# Patient Record
Sex: FEMALE | Race: AMERICAN INDIAN OR ALASKA NATIVE
[De-identification: names, ages, dates, MRNs, and addresses within clinical notes are randomized per-mention and may not be internally consistent; named-entity substitution may affect disease eponyms.]

---

## 2018-07-23 ENCOUNTER — HOSPITAL ENCOUNTER (EMERGENCY)
Dept: HOSPITAL 43 - DL.ED | Age: 58
Discharge: HOME | End: 2018-07-23
Payer: MEDICAID

## 2018-07-23 VITALS — DIASTOLIC BLOOD PRESSURE: 92 MMHG | SYSTOLIC BLOOD PRESSURE: 137 MMHG

## 2018-07-23 DIAGNOSIS — I10: ICD-10-CM

## 2018-07-23 DIAGNOSIS — Z79.899: ICD-10-CM

## 2018-07-23 DIAGNOSIS — S22.42XA: Primary | ICD-10-CM
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
## 2018-07-23 DIAGNOSIS — Z88.8: ICD-10-CM

## 2018-07-23 NOTE — CR
Clinical history: 58-year-old female complaining of left rib pain (fall).

 

Interpretation: Apparent nondisplaced fractures (age uncertain) anterolateral left sixth and anterior
 left seventh ribs with some underlying patchy atelectasis (postinflammatory fibrosis?), Left lower l
obe.

 

Osteopenia and mild scoliosis with subtle compression T7 vertebral body (associated disc degeneration
 T7-8).

 

Normal cardiac silhouette and pulmonary vascularity without alveolar edema or dependent effusion.

 

Note: This patient had a dense pneumonic like consolidation in the left lower lobe 30 October 2009 i.
e. some of what I am seeing could represent postinflammatory scarring left lung base.

 

No lung mass or focal lobar pneumonia. No pneumothorax.

 

CONCLUSION: Subtle irregularities anterior left sixth and seventh ribs (see above) that could represe
nt occult nondisplaced fractures. Underlying left lower lobe atelectasis or fibrosis. No pneumothorax
.

## 2018-07-23 NOTE — EDM.PDOC
ED HPI GENERAL MEDICAL PROBLEM





- General


Stated Complaint: [id number] CHEST PAIN


Time Seen by Provider: 07/23/18 13:06


Source of Information: Reports: Patient


History Limitations: Reports: No Limitations





- History of Present Illness


INITIAL COMMENTS - FREE TEXT/NARRATIVE: 





Patient comes emergency department today with complaints of an injury to her 

left chest. The timing of the injury is uncertain if she changes her story 

between nurse myself and the CNA. She tells me that 2 days ago she fell and 

injured her ribs. She tells the nursing staff that she fell 2 weeks ago. She 

does have a history of RA and walks with a walker and lost her balance and fell 

landing on her left ribs. She did not hit her head neck or back she denies any 

neck or back pain. She denies any loss of consciousness. She has been taking 

Tylenol for the pain. Has not been doing much for the pain. She has not applied 

any ice. She has had no shortness of breath fever cough or congestion. No 

abdominal pain nausea or vomiting. No black or tarry stools.


  ** Left Flank


Pain Score (Numeric/FACES): 8





- Related Data


 Allergies











Allergy/AdvReac Type Severity Reaction Status Date / Time


 


No Known Allergies Allergy   Verified 09/04/16 09:59











Home Meds: 


 Home Meds





Multivitamin [Multi Vitamin Daily] 1 each PO DAILY 12/17/13 [History]


prednisoLONE [Prednisolone] 10 mg PO DAILY 12/17/13 [History]


Bismuth Subsalicylate [Pepto Bismol] 30 ml PO ASDIRECTED PRN 12/04/14 [History]











Past Medical History


HEENT History: Reports: Impaired Vision


Cardiovascular History: Reports: Hypertension


Respiratory History: Reports: None


Gastrointestinal History: Reports: None


Genitourinary History: Reports: None


OB/GYN History: Reports: None


Musculoskeletal History: Reports: RA


Neurological History: Reports: None


Psychiatric History: Reports: None


Endocrine/Metabolic History: Reports: None


Hematologic History: Reports: None


Immunologic History: Reports: None


Oncologic (Cancer) History: Reports: None


Dermatologic History: Reports: None





Social & Family History





- Family History


Family Medical History: Noncontributory





- Living Situation & Occupation


Living situation: Reports: with Family





ED ROS GENERAL





- Review of Systems


Review Of Systems: ROS reveals no pertinent complaints other than HPI.





ED EXAM, GENERAL





- Physical Exam


Exam: See Below


Exam Limited By: No Limitations


General Appearance: Alert


Eye Exam: Bilateral Eye: Normal Inspection


Ears: Normal External Exam


Nose: Normal Inspection


Throat/Mouth: Normal Inspection


Head: Atraumatic, Normocephalic


Neck: Normal Inspection, Supple


Respiratory/Chest: No Respiratory Distress, No Accessory Muscle Use, Decreased 

Breath Sounds (In the left lower base.), Crackles (Left lower base. No wheezing)

, Splinting.  No: Lungs Clear, Normal Breath Sounds, Rhonchi, Wheezing, 

Accessory Muscle Use, Retractions


Cardiovascular: Normal Peripheral Pulses, Regular Rate, Rhythm


Peripheral Pulses: 2+: Radial (L), Radial (R)


GI/Abdominal: Normal Bowel Sounds, Soft, Non-Tender, No Organomegaly, No 

Distention, No Abnormal Bruit, No Mass, Pelvis Stable


 (Female) Exam: Deferred


Rectal (Female) Exam: Deferred


Back Exam: Normal Inspection, Full Range of Motion


Extremities: Normal Inspection, Normal Range of Motion, Normal Capillary Refill

, Other (Other than chronic changes of rheumatoid arthritis of her hands.)


Neurological: Alert, Oriented, No Motor/Sensory Deficits


Psychiatric: Flat Affect


Skin Exam: Warm, Dry, Intact, Normal Color





Course





- Vital Signs


Last Recorded V/S: 


 Last Vital Signs











Temp  36.9 C   07/23/18 12:49


 


Pulse  109 H  07/23/18 12:49


 


Resp  18   07/23/18 12:49


 


BP  137/92 H  07/23/18 12:49


 


Pulse Ox  96   07/23/18 12:49














- Radiology Interpretation


Free Text/Narrative:: 





Chest x-ray with rib detail per radiology subtle irregularities anterior left 

sixth and seventh ribs that could represent occult nondisplaced fractures. 

Underlying left lobe atelectasis or fibrosis no pneumothorax.





- Re-Assessments/Exams


Free Text/Narrative Re-Assessment/Exam: 





07/23/18 14:52


I did talk to the patient about the importance of incentive spirometry to 

prevent infection. I will send her home with some pain medication to make sure 

that she is turning, deep breathing as well as doing her incentive spirometry. 

She is to recheck if she develops a fever or cough or shortness of breath.





Departure





- Departure


Time of Disposition: 14:42


Disposition: Home, Self-Care 01


Clinical Impression: 


Ribs, multiple fractures


Qualifiers:


 Encounter type: initial encounter Fracture type: closed Laterality: left 

Qualified Code(s): S22.42XA - Multiple fractures of ribs, left side, initial 

encounter for closed fracture








- Discharge Information


*PRESCRIPTION DRUG MONITORING PROGRAM REVIEWED*: Yes


*COPY OF PRESCRIPTION DRUG MONITORING REPORT IN PATIENT MITCH: No


Instructions:  Rib Fracture, Easy-to-Read, Incentive Spirometer, Pain Medicine 

Instructions, Easy-to-Read


Additional Instructions: 


Ice to the sore areas. 


Tylenol and or Ibuprofen as needed for pain. 


If pain not controlled with above. Norco 1/2-1 tablet every 4-6 hrs as needed 

for pain. Caution sedation. RX given to the patient.


Incentive spirometry 6 times a day to prevent pneumonia. Turn cough deep breath 

when ever able to many times a day. 


If fever or respiratory distress develops recheck with a provider. 


Return to the ED if new or worsening symptoms. 


Follow up with primary care in the next 4-6 days if not improving sooner if 

worse. 





- Assessment/Plan


Assessment:: 





Left rib fractures 6th 7th. 


Contusion to the chest wall. 


Plan: 





Ice to the sore areas. 


Tylenol and or Ibuprofen as needed for pain. 


If pain not controlled with above. Norco 1/2-1 tablet every 4-6 hrs as needed 

for pain. Caution sedation. RX given to the patient.


Incentive spirometry 6 times a day to prevent pneumonia. Turn cough deep breath 

when ever able to many times a day. 


If fever or respiratory distress develops recheck with a provider. 


Return to the ED if new or worsening symptoms. 


Follow up with primary care in the next 4-6 days if not improving sooner if 

worse.